# Patient Record
Sex: MALE | Race: WHITE | NOT HISPANIC OR LATINO | Employment: OTHER | URBAN - NONMETROPOLITAN AREA
[De-identification: names, ages, dates, MRNs, and addresses within clinical notes are randomized per-mention and may not be internally consistent; named-entity substitution may affect disease eponyms.]

---

## 2024-01-17 NOTE — PROGRESS NOTES
Chief Complaint   Patient presents with    Establish Care     Patient referred per PCP for hx of MI in 2015 and HTN.    MI     Had in 2015, had stent placement at [facility]. He did stop smoking at that time. Attempting to obtain records. Father passed away at age of 55 due to heart disease.    Chest discomfort     Has had couple episodes of discomfort while sitting in tree stand with some pain between shoulders.    Shortness of Breath     Only notices when walking up and down hills.    Palpitations     Has occasional flutter. Was drinking a lot of coffee daily, and diet pepsi. He is trying to decrease caffeine intake.    Blood pressure      Had been elevated, PCP started lisinopril. He feels increase in BP is related to stress.         Subjective   Initial cardiac evaluation    HPI  HPI  Chris Ervin is a 58-year-old male in today for an initial cardiac evaluation.  He has a history of CAD, hypertension, diabetes and hyperlipidemia. He had an MI in  and was stented.  His father  of heart disease at age 55.  He is a former smoker stopped in  after the MI.  He admitted to drinking a sixpack of beer a week but no more than 2 at a time.  Labs managed by PCP.  Recent labs 2023: Normal CBC glucose 260, creatinine 0.87, GFR 99, potassium 5.4, normal liver enzymes, TSH 0.866, total cholesterol 187, triglycerides 135, HDL 35, .  He has not seen a cardiologist since 2017. Patient denies chest pain, pressure, palpitations, tightness, dizziness.  He reports that he has had a few episodes of chest discomfort and shortness of breath when walking up and down hills while deer hunting.  He has not had health insurance for a few years which has been causing stress.      Cardiac History  Past Surgical History:   Procedure Laterality Date    CARDIAC CATHETERIZATION Left 10/27/2015    PCI to proximal-mid LAD, Xience Alpine 2.75/38 mm drug-eluting stent.  LVEF 25 to 30%       Current Outpatient Medications    Medication Sig Dispense Refill    aspirin 81 MG EC tablet Take 1 tablet by mouth Daily.      glimepiride (AMARYL) 4 MG tablet Take 1 tablet by mouth 2 (Two) Times a Day.      Insulin Aspart (novoLOG) 100 UNIT/ML injection Inject  under the skin into the appropriate area as directed 3 (Three) Times a Day Before Meals. Per Sliding scale      insulin detemir (LEVEMIR) 100 UNIT/ML injection Inject 50 Units under the skin into the appropriate area as directed Every Night.      lisinopril (PRINIVIL,ZESTRIL) 10 MG tablet Take 1 tablet by mouth Daily.      Multiple Vitamins-Minerals (EYE VITAMINS PO) Take  by mouth 2 (Two) Times a Day.      NON FORMULARY Total restore supplement twice daily      Saw Palmetto 500 MG capsule Take 2 capsules by mouth Daily.      tamsulosin (FLOMAX) 0.4 MG capsule 24 hr capsule Take 1 capsule by mouth Daily.      vitamin D (ERGOCALCIFEROL) 1.25 MG (75912 UT) capsule capsule Take 1 capsule by mouth 1 (One) Time Per Week.       No current facility-administered medications for this visit.       Patient has no known allergies.    Past Medical History:   Diagnosis Date    Diabetes mellitus     History of carpal tunnel repair     right    Hx of tonsillectomy     Hyperlipidemia     Hypertension     Macular degeneration, wet     Myocardial infarction        Social History     Socioeconomic History    Marital status:    Tobacco Use    Smoking status: Former     Packs/day: 1.50     Years: 30.00     Additional pack years: 0.00     Total pack years: 45.00     Types: Cigarettes     Quit date: 10/27/2015     Years since quittin.2    Smokeless tobacco: Never   Vaping Use    Vaping Use: Never used    Passive vaping exposure: Yes   Substance and Sexual Activity    Alcohol use: Yes     Alcohol/week: 6.0 standard drinks of alcohol     Types: 6 Cans of beer per week    Drug use: Never    Sexual activity: Defer       Family History   Problem Relation Age of Onset    Hypertension Mother     Macular  "degeneration Mother         wet    Glaucoma Mother     Heart disease Father     Hypertension Father     Heart attack Father     Diabetes Father     Transient ischemic attack Father     No Known Problems Sister     No Known Problems Brother     No Known Problems Brother     Macular degeneration Maternal Grandmother     Lung cancer Maternal Grandmother     Heart attack Maternal Grandfather        Objective     Vitals:   /70 (BP Location: Left arm)   Pulse 78   Ht 172.7 cm (68\")   Wt 107 kg (236 lb 9.6 oz)   BMI 35.97 kg/m²     Physical Exam  Vitals and nursing note reviewed.   Neck:      Vascular: No carotid bruit.   Cardiovascular:      Rate and Rhythm: Normal rate and regular rhythm.      Pulses: Normal pulses.      Heart sounds: Normal heart sounds. No murmur heard.     No friction rub. No gallop.   Pulmonary:      Effort: Pulmonary effort is normal.      Breath sounds: Normal breath sounds and air entry.   Musculoskeletal:      Right lower leg: No edema.      Left lower leg: No edema.   Skin:     General: Skin is warm and dry.      Capillary Refill: Capillary refill takes less than 2 seconds.   Neurological:      Mental Status: He is alert and oriented to person, place, and time.           ECG 12 Lead    Date/Time: 1/18/2024 10:51 AM  Performed by: Nohemi José APRN    Authorized by: Nohemi José APRN  Previous ECG: no previous ECG available  Rhythm: sinus rhythm  Rate: normal  BPM: 78  Conduction: conduction normal    Clinical impression: normal ECG  Comments: QTc interval 387 ms          Assessment & Plan     Diagnoses and all orders for this visit:    1. SOB (shortness of breath) on exertion (Primary)  -     Adult Transthoracic Echo Complete W/ Cont if Necessary Per Protocol; Future  -     Stress Test With Myocardial Perfusion One Day; Future  -     ECG 12 Lead    2. Essential hypertension    3. Coronary artery disease involving native coronary artery of native heart, unspecified whether angina " present    4. Hyperlipidemia LDL goal <100    Shortness of breath  - Ordered stress test and echocardiogram to evaluate for ischemia, ejection fraction, valve structures    HTN  - BP slightly elevated today  - He monitors at home and states it stays in the 120s to 130s SBP  - Continue lisinopril    Hyperlipidemia  - Lipids mostly controlled, low HDL, high LDL  - He declined statin.  He wishes to manage with diet and exercise  - Eliminate sugar and limit carbohydrates.  Recommend fish oil    CAD  - Continue aspirin 81 mg daily  - Ordered stress test and echocardiogram    In office EKG today revealed sinus rhythm with a rate of 78 bpm and a normal QTc 387 ms    Visit Diagnoses:    ICD-10-CM ICD-9-CM   1. SOB (shortness of breath) on exertion  R06.02 786.05   2. Essential hypertension  I10 401.9   3. Coronary artery disease involving native coronary artery of native heart, unspecified whether angina present  I25.10 414.01   4. Hyperlipidemia LDL goal <100  E78.5 272.4       Meds Ordered During Visit:  No orders of the defined types were placed in this encounter.      Follow Up Appointment:   Return in about 6 months (around 7/18/2024), or if symptoms worsen or fail to improve.           This document has been electronically signed by FLACO Delacruz  January 18, 2024 12:08 EST    Dictated Utilizing Dragon Dictation: Part of this note may be an electronic transcription/translation of spoken language to printed text using the Dragon Dictation System.

## 2024-01-18 ENCOUNTER — PATIENT ROUNDING (BHMG ONLY) (OUTPATIENT)
Dept: CARDIOLOGY | Facility: CLINIC | Age: 59
End: 2024-01-18
Payer: COMMERCIAL

## 2024-01-18 ENCOUNTER — OFFICE VISIT (OUTPATIENT)
Dept: CARDIOLOGY | Facility: CLINIC | Age: 59
End: 2024-01-18
Payer: COMMERCIAL

## 2024-01-18 VITALS
SYSTOLIC BLOOD PRESSURE: 144 MMHG | BODY MASS INDEX: 35.86 KG/M2 | HEART RATE: 78 BPM | DIASTOLIC BLOOD PRESSURE: 70 MMHG | WEIGHT: 236.6 LBS | HEIGHT: 68 IN

## 2024-01-18 DIAGNOSIS — E78.5 HYPERLIPIDEMIA LDL GOAL <100: ICD-10-CM

## 2024-01-18 DIAGNOSIS — I25.10 CORONARY ARTERY DISEASE INVOLVING NATIVE CORONARY ARTERY OF NATIVE HEART, UNSPECIFIED WHETHER ANGINA PRESENT: ICD-10-CM

## 2024-01-18 DIAGNOSIS — I10 ESSENTIAL HYPERTENSION: ICD-10-CM

## 2024-01-18 DIAGNOSIS — R06.02 SOB (SHORTNESS OF BREATH) ON EXERTION: Primary | ICD-10-CM

## 2024-01-18 PROBLEM — E11.9 DM (DIABETES MELLITUS), TYPE 2: Status: ACTIVE | Noted: 2024-01-18

## 2024-01-18 RX ORDER — ASPIRIN 81 MG/1
81 TABLET ORAL DAILY
COMMUNITY

## 2024-01-18 RX ORDER — LISINOPRIL 10 MG/1
10 TABLET ORAL DAILY
COMMUNITY

## 2024-01-18 RX ORDER — ERGOCALCIFEROL 1.25 MG/1
50000 CAPSULE ORAL WEEKLY
COMMUNITY

## 2024-01-18 RX ORDER — TAMSULOSIN HYDROCHLORIDE 0.4 MG/1
1 CAPSULE ORAL DAILY
COMMUNITY

## 2024-01-18 RX ORDER — GUAIFENESIN/PHENYLPROPANOLAMIN
2 EXPECTORANT ORAL DAILY
COMMUNITY

## 2024-01-18 RX ORDER — GLIMEPIRIDE 4 MG/1
4 TABLET ORAL 2 TIMES DAILY
COMMUNITY

## 2024-01-18 RX ORDER — INSULIN ASPART 100 [IU]/ML
INJECTION, SOLUTION INTRAVENOUS; SUBCUTANEOUS
COMMUNITY

## 2024-01-18 NOTE — PROGRESS NOTES
January 18, 2024    Hello, may I speak with Chris Ervin?    My name is ABBY      I am  with [facility]  [facility]  [address] [place] [address] [phone].    Before we get started may I verify your date of birth? 1965    I am calling to officially welcome you to our practice and ask about your recent visit. Is this a good time to talk? yes    Tell me about your visit with us. What things went well?  ALL WENT WELL       We're always looking for ways to make our patients' experiences even better. Do you have recommendations on ways we may improve?  no    Overall were you satisfied with your first visit to our practice? yes       I appreciate you taking the time to speak with me today. Is there anything else I can do for you? no      Thank you, and have a great day.

## 2024-01-31 ENCOUNTER — TELEPHONE (OUTPATIENT)
Dept: CARDIOLOGY | Facility: CLINIC | Age: 59
End: 2024-01-31
Payer: COMMERCIAL

## 2024-01-31 NOTE — TELEPHONE ENCOUNTER
REQUEST FOR CARDIAC CLEARANCE    Caller name: Chris Ervin     Phone Number: [phone]     Surgeon's name: DR. SEBASTIAN     Type of planned surgery: colonoscopy    Date of planned surgery: WAITING ON SURG     Type of anesthesia: MAC     Have you been experiencing chest pain or shortness of breath? UNKNOWN     Is your doctor requesting for you to stop any of your medications prior to your surgery? BLOOD THINNERS     Where should we fax the clearance to? [phone]     SENDING OVER FORM, PLEASE SEND BACK FILLED OUT AND SIGNED.

## 2024-02-05 ENCOUNTER — HOSPITAL ENCOUNTER (OUTPATIENT)
Dept: CARDIOLOGY | Facility: HOSPITAL | Age: 59
Discharge: HOME | End: 2024-02-05
Payer: COMMERCIAL

## 2024-02-05 VITALS — HEIGHT: 68 IN | WEIGHT: 235.89 LBS | BODY MASS INDEX: 35.75 KG/M2

## 2024-02-05 DIAGNOSIS — R06.02 SOB (SHORTNESS OF BREATH) ON EXERTION: ICD-10-CM

## 2024-02-05 LAB
AORTIC DIMENSIONLESS INDEX: 1.1 (DI)
BH CV ECHO MEAS - ACS: 2.21 CM
BH CV ECHO MEAS - AO MAX PG: 3.9 MMHG
BH CV ECHO MEAS - AO MEAN PG: 2.45 MMHG
BH CV ECHO MEAS - AO ROOT DIAM: 3.6 CM
BH CV ECHO MEAS - AO V2 MAX: 98.5 CM/SEC
BH CV ECHO MEAS - AO V2 VTI: 20.5 CM
BH CV ECHO MEAS - EDV(CUBED): 92.6 ML
BH CV ECHO MEAS - EF(MOD-BP): 56 %
BH CV ECHO MEAS - ESV(CUBED): 33.3 ML
BH CV ECHO MEAS - FS: 28.9 %
BH CV ECHO MEAS - IVS/LVPW: 1.01 CM
BH CV ECHO MEAS - IVSD: 1.16 CM
BH CV ECHO MEAS - LA DIMENSION: 3.7 CM
BH CV ECHO MEAS - LAT PEAK E' VEL: 8.9 CM/SEC
BH CV ECHO MEAS - LV MASS(C)D: 187.8 GRAMS
BH CV ECHO MEAS - LV MAX PG: 4.3 MMHG
BH CV ECHO MEAS - LV MEAN PG: 1.87 MMHG
BH CV ECHO MEAS - LV V1 MAX: 103.7 CM/SEC
BH CV ECHO MEAS - LV V1 VTI: 23.5 CM
BH CV ECHO MEAS - LVIDD: 4.5 CM
BH CV ECHO MEAS - LVIDS: 3.2 CM
BH CV ECHO MEAS - LVPWD: 1.14 CM
BH CV ECHO MEAS - MED PEAK E' VEL: 5.2 CM/SEC
BH CV ECHO MEAS - MV A MAX VEL: 81.7 CM/SEC
BH CV ECHO MEAS - MV DEC SLOPE: 369.9 CM/SEC2
BH CV ECHO MEAS - MV DEC TIME: 0.23 SEC
BH CV ECHO MEAS - MV E MAX VEL: 89.6 CM/SEC
BH CV ECHO MEAS - MV E/A: 1.1
BH CV ECHO MEAS - MV MAX PG: 4.4 MMHG
BH CV ECHO MEAS - MV MEAN PG: 1.73 MMHG
BH CV ECHO MEAS - MV P1/2T: 73.4 MSEC
BH CV ECHO MEAS - MV V2 VTI: 35.3 CM
BH CV ECHO MEAS - MVA(P1/2T): 3 CM2
BH CV ECHO MEAS - PA V2 MAX: 91.8 CM/SEC
BH CV ECHO MEAS - RAP SYSTOLE: 8 MMHG
BH CV ECHO MEAS - RV MAX PG: 1.61 MMHG
BH CV ECHO MEAS - RV V1 MAX: 63.4 CM/SEC
BH CV ECHO MEAS - RV V1 VTI: 12.5 CM
BH CV ECHO MEAS - RVDD: 3.3 CM
BH CV ECHO MEAS - RVSP: 12.7 MMHG
BH CV ECHO MEAS - TAPSE (>1.6): 2.8 CM
BH CV ECHO MEAS - TR MAX PG: 4.7 MMHG
BH CV ECHO MEAS - TR MAX VEL: 108.2 CM/SEC
BH CV ECHO MEASUREMENTS AVERAGE E/E' RATIO: 12.71
BH CV REST NUCLEAR ISOTOPE DOSE: 10 MCI
BH CV STRESS COMMENTS STAGE 1: NORMAL
BH CV STRESS DOSE REGADENOSON STAGE 1: 0.4
BH CV STRESS DURATION MIN STAGE 1: 0
BH CV STRESS DURATION SEC STAGE 1: 10
BH CV STRESS NUCLEAR ISOTOPE DOSE: 30 MCI
BH CV STRESS PROTOCOL 1: NORMAL
BH CV STRESS RECOVERY BP: NORMAL MMHG
BH CV STRESS RECOVERY HR: 99 BPM
BH CV STRESS STAGE 1: 1
BH CV XLRA - TDI S': 13.5 CM/SEC
LV EF NUC BP: 49 %
MAXIMAL PREDICTED HEART RATE: 160 BPM
PERCENT MAX PREDICTED HR: 69.38 %
SINUS: 3.3 CM
STRESS BASELINE BP: NORMAL MMHG
STRESS BASELINE HR: 72 BPM
STRESS PERCENT HR: 82 %
STRESS POST PEAK BP: NORMAL MMHG
STRESS POST PEAK HR: 111 BPM
STRESS TARGET HR: 136 BPM

## 2024-02-05 PROCEDURE — 25010000002 REGADENOSON 0.4 MG/5ML SOLUTION: Performed by: INTERNAL MEDICINE

## 2024-02-05 PROCEDURE — 93306 TTE W/DOPPLER COMPLETE: CPT

## 2024-02-05 PROCEDURE — 78452 HT MUSCLE IMAGE SPECT MULT: CPT

## 2024-02-05 PROCEDURE — 0 TECHNETIUM SESTAMIBI: Performed by: INTERNAL MEDICINE

## 2024-02-05 PROCEDURE — 93017 CV STRESS TEST TRACING ONLY: CPT

## 2024-02-05 PROCEDURE — A9500 TC99M SESTAMIBI: HCPCS | Performed by: INTERNAL MEDICINE

## 2024-02-05 PROCEDURE — 93306 TTE W/DOPPLER COMPLETE: CPT | Performed by: INTERNAL MEDICINE

## 2024-02-05 PROCEDURE — 93018 CV STRESS TEST I&R ONLY: CPT | Performed by: INTERNAL MEDICINE

## 2024-02-05 PROCEDURE — 78452 HT MUSCLE IMAGE SPECT MULT: CPT | Performed by: INTERNAL MEDICINE

## 2024-02-05 RX ORDER — REGADENOSON 0.08 MG/ML
0.4 INJECTION, SOLUTION INTRAVENOUS
Status: COMPLETED | OUTPATIENT
Start: 2024-02-05 | End: 2024-02-05

## 2024-02-05 RX ADMIN — TECHNETIUM TC 99M SESTAMIBI 1 DOSE: 1 INJECTION INTRAVENOUS at 09:40

## 2024-02-05 RX ADMIN — REGADENOSON 0.4 MG: 0.08 INJECTION, SOLUTION INTRAVENOUS at 11:16

## 2024-02-05 RX ADMIN — TECHNETIUM TC 99M SESTAMIBI 1 DOSE: 1 INJECTION INTRAVENOUS at 11:16

## 2024-02-07 ENCOUNTER — TELEPHONE (OUTPATIENT)
Dept: CARDIOLOGY | Facility: CLINIC | Age: 59
End: 2024-02-07
Payer: COMMERCIAL

## 2024-02-07 NOTE — TELEPHONE ENCOUNTER
Received fax from Dr. Burroughs for cardiac clearance for patient to have a colonoscopy under MAC. Patient is on aspirin, unclear if needing to hold. According to our records, patient's last stenting was done on 10/27/15.          Fax [phone]

## 2024-07-24 ENCOUNTER — OFFICE VISIT (OUTPATIENT)
Dept: CARDIOLOGY | Facility: CLINIC | Age: 59
End: 2024-07-24
Payer: COMMERCIAL

## 2024-07-24 VITALS
WEIGHT: 224.8 LBS | BODY MASS INDEX: 34.07 KG/M2 | HEART RATE: 84 BPM | SYSTOLIC BLOOD PRESSURE: 128 MMHG | DIASTOLIC BLOOD PRESSURE: 62 MMHG | HEIGHT: 68 IN

## 2024-07-24 DIAGNOSIS — E78.5 HYPERLIPIDEMIA LDL GOAL <100: ICD-10-CM

## 2024-07-24 DIAGNOSIS — I25.10 CORONARY ARTERY DISEASE INVOLVING NATIVE CORONARY ARTERY OF NATIVE HEART, UNSPECIFIED WHETHER ANGINA PRESENT: ICD-10-CM

## 2024-07-24 DIAGNOSIS — I10 ESSENTIAL HYPERTENSION: Primary | ICD-10-CM

## 2024-07-24 DIAGNOSIS — R40.0 DAYTIME SLEEPINESS: ICD-10-CM

## 2024-07-24 NOTE — PROGRESS NOTES
Chief Complaint   Patient presents with    Follow-up     Pt is here for cardiac follow up. Pt does have some palpitations at times but new normal. Pt last stress test had to be stopped due to HTN response, pt would like to know when he can have a new one done to complete it.       Med Refill     Pt does not need refills at this time.     Labs Only     2023 last in chart. Had some a week ago with PCP. A1C dropped.         HPI:  HPI   Chris Ervin is a 59-year-old male with a history of CAD, hypertension, diabetes and hyperlipidemia. He had an MI in  and was stented.  His father  of heart disease at age 55.  He is a former smoker stopped in  after the MI.  He admitted to drinking a sixpack of beer a week but no more than 2 at a time.  Labs managed by PCP.  Recent labs 2023: Normal CBC glucose 260, creatinine 0.87, GFR 99, potassium 5.4, normal liver enzymes, TSH 0.866, total cholesterol 187, triglycerides 135, HDL 35, .  He has not seen a cardiologist since . He reported that he had a few episodes of chest discomfort and shortness of breath when walking up and down hills while deer hunting.  He has not had health insurance for a few years which has been causing stress.     He is here today for a follow up visit.  After his initial visit in January he did have a Lexiscan stress test that showed a hypertensive blood pressure response borderline ejection fraction and small septal infarct with no ischemia.  He was started on lisinopril and HCTZ.  Was recommended that we evaluate for sleep apnea.  Echocardiogram showed 50% ejection fraction with mild MVR and TVR. He has had a 10 lb weight loss and BP has been very controlled. He has stopped HCTZ due to BP dropping too low after weight loss. Patient denies chest pain, pressure, palpitations, tightness, dizziness, shortness of air. I had recommended overnight oxymetry after stress. He is agreeable. Labs a couple weeks ago and lipids  controlled, A1C dropped.    Cardiac History:    Past Surgical History:   Procedure Laterality Date    CARDIAC CATHETERIZATION Left 10/27/2015    PCI to proximal-mid LAD, Xience Alpine 2.75/38 mm drug-eluting stent.  LVEF 25 to 30%    CARDIOVASCULAR STRESS TEST  02/05/2024    Lexiscan- EF 49%. 224/68. Septal Infarct    ECHO - CONVERTED  02/05/2024    TLS. EF 50%. Septal WMA. Trace-Mild MR. RVSP- 13 mmHg       Current Outpatient Medications   Medication Sig Dispense Refill    aspirin 81 MG EC tablet Take 1 tablet by mouth Daily.      glimepiride (AMARYL) 4 MG tablet Take 1 tablet by mouth 2 (Two) Times a Day.      Insulin Aspart (novoLOG) 100 UNIT/ML injection Inject  under the skin into the appropriate area as directed 3 (Three) Times a Day Before Meals. Per Sliding scale      insulin detemir (LEVEMIR) 100 UNIT/ML injection Inject 50 Units under the skin into the appropriate area as directed Every Night.      lisinopril (PRINIVIL,ZESTRIL) 20 MG tablet Take 1 tablet by mouth Daily. 90 tablet 3    Multiple Vitamins-Minerals (EYE VITAMINS PO) Take  by mouth 2 (Two) Times a Day.      NON FORMULARY Total restore supplement twice daily      Saw Palmetto 500 MG capsule Take 2 capsules by mouth Daily.      tamsulosin (FLOMAX) 0.4 MG capsule 24 hr capsule Take 1 capsule by mouth Daily.      vitamin D (ERGOCALCIFEROL) 1.25 MG (19953 UT) capsule capsule Take 1 capsule by mouth 1 (One) Time Per Week.       No current facility-administered medications for this visit.       Patient has no known allergies.    Past Medical History:   Diagnosis Date    Diabetes mellitus     History of carpal tunnel repair     right    Hx of tonsillectomy     Hyperlipidemia     Hypertension     Macular degeneration, wet     Myocardial infarction        Social History     Socioeconomic History    Marital status:    Tobacco Use    Smoking status: Former     Current packs/day: 0.00     Average packs/day: 1.5 packs/day for 30.0 years (45.0 ttl  "pk-yrs)     Types: Cigarettes     Start date: 10/27/1985     Quit date: 10/27/2015     Years since quittin.7    Smokeless tobacco: Never   Vaping Use    Vaping status: Never Used    Passive vaping exposure: Yes   Substance and Sexual Activity    Alcohol use: Yes     Alcohol/week: 6.0 standard drinks of alcohol     Types: 6 Cans of beer per week    Drug use: Never    Sexual activity: Defer       Family History   Problem Relation Age of Onset    Hypertension Mother     Macular degeneration Mother         wet    Glaucoma Mother     Heart disease Father     Hypertension Father     Heart attack Father     Diabetes Father     Transient ischemic attack Father     No Known Problems Sister     No Known Problems Brother     No Known Problems Brother     Macular degeneration Maternal Grandmother     Lung cancer Maternal Grandmother     Heart attack Maternal Grandfather        Vitals:   /62 (BP Location: Right arm, Patient Position: Sitting, Cuff Size: Adult)   Pulse 84   Ht 172 cm (67.72\")   Wt 102 kg (224 lb 12.8 oz)   BMI 34.46 kg/m²     Physical Exam  Vitals and nursing note reviewed.   Constitutional:       Appearance: He is obese.   Neck:      Vascular: No carotid bruit.   Cardiovascular:      Rate and Rhythm: Normal rate and regular rhythm.      Pulses: Normal pulses.      Heart sounds: Normal heart sounds. No murmur heard.     No friction rub. No gallop.   Pulmonary:      Effort: Pulmonary effort is normal.      Breath sounds: Normal breath sounds and air entry.   Musculoskeletal:      Right lower leg: No edema.      Left lower leg: No edema.   Skin:     General: Skin is warm and dry.      Capillary Refill: Capillary refill takes less than 2 seconds.   Neurological:      Mental Status: He is alert and oriented to person, place, and time.       Procedures     Assessment & Plan     Diagnoses and all orders for this visit:    1. Essential hypertension (Primary)    2. Coronary artery disease involving native " coronary artery of native heart, unspecified whether angina present    3. Hyperlipidemia LDL goal <100    4. Daytime sleepiness  -     Overnight Sleep Oximetry Study; Future    Hypertension  - BP is controlled    CAD  - Stress test earlier this year showed no ischemia but did show an area of infarct with a hypertensive blood pressure response  - Continue aspirin    Hyperlipidemia  - According to patient, labs were with PCP and lipids were controlled with lifestyle management    Daytime sleepiness  - Recommend overnight oximetry and if abnormal, referral to pulmonology    Recommend overnight oxymetry.  Stable from cardiac standpoint. No refills needed.     Visit Diagnoses:    ICD-10-CM ICD-9-CM   1. Essential hypertension  I10 401.9   2. Coronary artery disease involving native coronary artery of native heart, unspecified whether angina present  I25.10 414.01   3. Hyperlipidemia LDL goal <100  E78.5 272.4   4. Daytime sleepiness  R40.0 780.54       Meds Ordered During Visit:  No orders of the defined types were placed in this encounter.      Follow Up Appointment:   Return in about 6 months (around 1/24/2025), or if symptoms worsen or fail to improve.           This document has been electronically signed by FLACO Delacruz  July 24, 2024 15:21 EDT    Dictated Utilizing Dragon Dictation: Part of this note may be an electronic transcription/translation of spoken language to printed text using the Dragon Dictation System.

## 2024-12-23 ENCOUNTER — TELEPHONE (OUTPATIENT)
Dept: CARDIOLOGY | Facility: CLINIC | Age: 59
End: 2024-12-23

## 2024-12-23 DIAGNOSIS — G47.34 NOCTURNAL OXYGEN DESATURATION: Primary | ICD-10-CM

## 2024-12-23 NOTE — TELEPHONE ENCOUNTER
I spoke with patient and he has been experiencing palpitations which are  becoming more frequent  . He had referral to see Pulmonology which was scheduled in [place] , he was unable to keep appointment due to distance traveling . He is hesitant to wear CPAP if needed but is willing to reschedule appointment with pulmonology .

## 2024-12-23 NOTE — TELEPHONE ENCOUNTER
"Hub staff attempted to follow warm transfer process and was unsuccessful     Caller: Chris Ervin    Relationship to patient: Self    Best call back number: [phone]     Patient is needing: PT IS NOT HAVING PAIN OR DISCOMFORT, BUT HAS A \"FLUTTER THING\" THAT HAS BEEN GOING ON. DOES THIS ONCE IN AWHILE, BUT IS NOW HAPPENING MORE. BP IS UP A LITTLE BIT.     ACTIVE RIGHT BEFORE HE CALLED BUT NOT AS OF PHONE CALL   A FEW MONTHS   WAS HAPPENING WHEN PT WAS SEEN LAST TIME     STATES MAYBE NEEDING 24 HR HEART MONITOR PER PROVIDER STATEMENT AT LAST APPT       "

## 2024-12-24 NOTE — TELEPHONE ENCOUNTER
Ordered a Holter monitor for 7 days that will be mailed to him.  He needs to wear the monitor and then mail it back.  We are closed for the next 7 days but as soon as we return and get the results we will call him with results.  I have put in a new referral to Aline schwarz in the [place] office.  There is a oh longer wait but I do want him to see pulmonology because of the abnormal overnight.  I think this is a root cause that needs to be addressed.

## 2024-12-24 NOTE — TELEPHONE ENCOUNTER
I left detailed message for patient about ordered Holter monitor , he is to wear it for 7 days   and mail back. I also made him aware of new referral to [facility] pulmonolgy

## 2025-01-02 ENCOUNTER — CLINICAL SUPPORT (OUTPATIENT)
Dept: CARDIOLOGY | Facility: CLINIC | Age: 60
End: 2025-01-02
Payer: COMMERCIAL

## 2025-01-02 ENCOUNTER — OFFICE VISIT (OUTPATIENT)
Dept: PULMONOLOGY | Facility: CLINIC | Age: 60
End: 2025-01-02
Payer: COMMERCIAL

## 2025-01-02 VITALS
HEART RATE: 84 BPM | OXYGEN SATURATION: 98 % | BODY MASS INDEX: 34.25 KG/M2 | HEIGHT: 68 IN | WEIGHT: 226 LBS | SYSTOLIC BLOOD PRESSURE: 140 MMHG | DIASTOLIC BLOOD PRESSURE: 76 MMHG

## 2025-01-02 DIAGNOSIS — G47.19 DAYTIME HYPERSOMNOLENCE: Primary | ICD-10-CM

## 2025-01-02 DIAGNOSIS — Z87.891 PERSONAL HISTORY OF NICOTINE DEPENDENCE: ICD-10-CM

## 2025-01-02 DIAGNOSIS — Z12.2 ENCOUNTER FOR SCREENING FOR LUNG CANCER: ICD-10-CM

## 2025-01-02 DIAGNOSIS — R06.83 SNORING: ICD-10-CM

## 2025-01-02 DIAGNOSIS — E66.9 OBESITY (BMI 30-39.9): ICD-10-CM

## 2025-01-02 RX ORDER — CHOLECALCIFEROL (VITAMIN D3) 1250 MCG
1 CAPSULE ORAL WEEKLY
COMMUNITY
Start: 2024-10-23

## 2025-01-02 RX ORDER — INSULIN GLARGINE 100 [IU]/ML
INJECTION, SOLUTION SUBCUTANEOUS
COMMUNITY
Start: 2024-12-16

## 2025-01-02 NOTE — PROGRESS NOTES
New Pulmonary Patient Office Visit      Patient Name: Chris Ervin    Referring Physician: Nohemi José APRN    Chief Complaint:    Chief Complaint   Patient presents with    Sleeping Problem       History of Present Illness: Chris Ervin is a 59 y.o. male who is referred here today by cardiology to establish care with Pulmonary for sleep apnea evaluation. He notes that he awakens 2-3x/night to go to the bathroom. He reports a longstanding hx of witnessed apneas. +daytime hypersomnolence. +snoring. No known hc of underlying lung disease. He reports good functional capacity.  He has been losing weight with lifestyle modifications and plans to continue doing so.    Supplemental Oxygen: No  Cardiac history: MI in 2015  Smoking history: Up to 1.5 pack/day for 30 years, quit in 2015    Subjective      Review of Systems:   Review of Systems   Constitutional:  Positive for fatigue. Negative for fever and unexpected weight change.   Respiratory:  Negative for shortness of breath.    Cardiovascular:  Negative for chest pain.   Psychiatric/Behavioral:  Positive for sleep disturbance.         Past Medical History:   Past Medical History:   Diagnosis Date    Diabetes mellitus     History of carpal tunnel repair     right    Hx of tonsillectomy     Hyperlipidemia     Hypertension     Macular degeneration, wet     Myocardial infarction        Past Surgical History:   Past Surgical History:   Procedure Laterality Date    CARDIAC CATHETERIZATION Left 10/27/2015    PCI to proximal-mid LAD, Xience Alpine 2.75/38 mm drug-eluting stent.  LVEF 25 to 30%    CARDIOVASCULAR STRESS TEST  02/05/2024    Lexiscan- EF 49%. 224/68. Septal Infarct    ECHO - CONVERTED  02/05/2024    TLS. EF 50%. Septal WMA. Trace-Mild MR. RVSP- 13 mmHg       Family History:   Family History   Problem Relation Age of Onset    Hypertension Mother     Macular degeneration Mother         wet    Glaucoma Mother     Heart disease Father     Hypertension  Father     Heart attack Father     Diabetes Father     Transient ischemic attack Father     No Known Problems Sister     No Known Problems Brother     No Known Problems Brother     Macular degeneration Maternal Grandmother     Lung cancer Maternal Grandmother     Heart attack Maternal Grandfather        Social History:   Social History     Socioeconomic History    Marital status:    Tobacco Use    Smoking status: Former     Current packs/day: 0.00     Average packs/day: 1.5 packs/day for 30.0 years (45.0 ttl pk-yrs)     Types: Cigarettes     Start date: 10/27/1985     Quit date: 10/27/2015     Years since quittin.1     Passive exposure: Past    Smokeless tobacco: Never   Vaping Use    Vaping status: Never Used    Passive vaping exposure: Yes   Substance and Sexual Activity    Alcohol use: Yes     Alcohol/week: 6.0 standard drinks of alcohol     Types: 6 Cans of beer per week    Drug use: Never    Sexual activity: Defer       Medications:     Current Outpatient Medications:     aspirin 81 MG EC tablet, Take 1 tablet by mouth Daily., Disp: , Rfl:     Cholecalciferol (Vitamin D3) 1.25 MG (62081 UT) capsule, Take 1 capsule by mouth 1 (One) Time Per Week., Disp: , Rfl:     glimepiride (AMARYL) 4 MG tablet, Take 1 tablet by mouth 2 (Two) Times a Day., Disp: , Rfl:     Insulin Aspart (novoLOG) 100 UNIT/ML injection, Inject  under the skin into the appropriate area as directed 3 (Three) Times a Day Before Meals. Per Sliding scale, Disp: , Rfl:     insulin detemir (LEVEMIR) 100 UNIT/ML injection, Inject 50 Units under the skin into the appropriate area as directed Every Night., Disp: , Rfl:     Lantus SoloStar 100 UNIT/ML injection pen, inject 30 units sub-q TWICE DAILY, Disp: , Rfl:     lisinopril (PRINIVIL,ZESTRIL) 20 MG tablet, Take 1 tablet by mouth Daily., Disp: 90 tablet, Rfl: 3    Multiple Vitamins-Minerals (EYE VITAMINS PO), Take  by mouth 2 (Two) Times a Day., Disp: , Rfl:     NON FORMULARY, Total restore  "supplement twice daily, Disp: , Rfl:     Saw Palmetto 500 MG capsule, Take 2 capsules by mouth Daily., Disp: , Rfl:     tamsulosin (FLOMAX) 0.4 MG capsule 24 hr capsule, Take 1 capsule by mouth Daily., Disp: , Rfl:     vitamin D (ERGOCALCIFEROL) 1.25 MG (05556 UT) capsule capsule, Take 1 capsule by mouth 1 (One) Time Per Week. (Patient not taking: Reported on 1/2/2025), Disp: , Rfl:     Allergies:   No Known Allergies    Objective     Physical Exam:  Vital Signs:   Vitals:    01/02/25 0903   BP: 140/76   Pulse: 84   SpO2: 98%   Weight: 103 kg (226 lb)   Height: 172.1 cm (67.75\")     Body mass index is 34.62 kg/m².    Physical Exam  Vitals reviewed.   Constitutional:       General: He is not in acute distress.     Appearance: He is not toxic-appearing.   HENT:      Head: Normocephalic and atraumatic.      Mouth/Throat:      Mouth: Mucous membranes are moist.   Eyes:      Extraocular Movements: Extraocular movements intact.      Conjunctiva/sclera: Conjunctivae normal.   Cardiovascular:      Rate and Rhythm: Normal rate.      Heart sounds: Normal heart sounds.   Pulmonary:      Effort: Pulmonary effort is normal.      Breath sounds: Normal breath sounds.   Abdominal:      General: There is no distension.      Palpations: Abdomen is soft.   Musculoskeletal:         General: No swelling.      Cervical back: Neck supple.   Skin:     General: Skin is warm and dry.      Findings: No rash.   Neurological:      General: No focal deficit present.      Mental Status: He is alert and oriented to person, place, and time.   Psychiatric:         Mood and Affect: Mood normal.         Behavior: Behavior normal.       Results Review:   July 2024 overnight pulse oximetry study showed O2 saturation less than or equal to 88% for 1 hour 34 seconds.    Results for orders placed during the hospital encounter of 02/05/24    Adult Transthoracic Echo Complete W/ Cont if Necessary Per Protocol    Interpretation Summary    The study is " technically difficult    Left ventricular wall thickness is consistent with mild concentric hypertrophy.    Left ventricular ejection fraction appears to be 46 - 50%.    The following left ventricular wall segments are hypokinetic: apical septal and apex hypokinetic.    Left ventricular diastolic function is consistent with (grade I) impaired relaxation.    The left atrial cavity is borderline dilated. 3.7 Cm    There is trivial thickening of the non-coronary cusp(s) of the aortic valve    No aortic valve regurgitation or stenosis is present.    Trace to mild mitral valve regurgitation is present.    Trace to mild tricuspid valve regurgitation is present.  RVSP- 13 mmHg    Assessment / Plan      Assessment/Plan:    Diagnoses and all orders for this visit:    1. Daytime hypersomnolence (Primary)  -     Home Sleep Study; Future  Patient with multiple symptoms suggestive of sleep apnea. Discussed treatment options if HSS is positive. Will check sleep study to evaluate. The patient was counseled on the risks of untreated sleep apnea and voiced understanding. Avoid sleepy driving.    2. Snoring  -     Home Sleep Study; Future    3. Personal history of nicotine dependence  -      CT Chest Low Dose Cancer Screening WO; Future  Maintains cessation.    4. Encounter for screening for lung cancer  -      CT Chest Low Dose Cancer Screening WO; Future  Patient with greater than 30-pack-year smoking history, and therefore low-dose lung cancer screening is recommended.  Shared decision making counseling including risks and benefits of low-dose lung cancer screening, follow-up diagnostic testing that may be indicated and how comorbid conditions would affect diagnosis/treatment, false positive rates, and total radiation exposure.  Patient wishes to undergo screening, seen as they would be willing to seek diagnosis and treatment if necessitated by results.    5. Obesity (BMI 30-39.9)  Ongoing efforts to lose weight will be beneficial  and he was advised to continue doing so.       Follow Up:   Return in about 3 months (around 4/2/2025) for Recheck.  The patient was counseled on diagnostic results, risks and benefits of treatment options, risk factor modifications and the importance of treatment compliance. The patient was advised to contact the clinic with concerns or worsening symptoms.     FLACO Tsai  [facility] [place]    This document has been electronically signed by FLACO Tsai  January 2, 2025

## 2025-01-03 ENCOUNTER — PATIENT ROUNDING (BHMG ONLY) (OUTPATIENT)
Dept: PULMONOLOGY | Facility: CLINIC | Age: 60
End: 2025-01-03
Payer: COMMERCIAL

## 2025-01-10 DIAGNOSIS — Z12.2 ENCOUNTER FOR SCREENING FOR LUNG CANCER: ICD-10-CM

## 2025-01-10 DIAGNOSIS — Z87.891 PERSONAL HISTORY OF NICOTINE DEPENDENCE: ICD-10-CM

## 2025-02-05 ENCOUNTER — OFFICE VISIT (OUTPATIENT)
Dept: CARDIOLOGY | Facility: CLINIC | Age: 60
End: 2025-02-05
Payer: COMMERCIAL

## 2025-02-05 VITALS
HEIGHT: 68 IN | WEIGHT: 231.8 LBS | SYSTOLIC BLOOD PRESSURE: 150 MMHG | HEART RATE: 71 BPM | BODY MASS INDEX: 35.13 KG/M2 | DIASTOLIC BLOOD PRESSURE: 72 MMHG | OXYGEN SATURATION: 97 %

## 2025-02-05 DIAGNOSIS — I10 ESSENTIAL HYPERTENSION: ICD-10-CM

## 2025-02-05 DIAGNOSIS — E78.5 HYPERLIPIDEMIA LDL GOAL <100: ICD-10-CM

## 2025-02-05 DIAGNOSIS — R07.9 CHEST PAIN, UNSPECIFIED TYPE: Primary | ICD-10-CM

## 2025-02-05 PROCEDURE — 99214 OFFICE O/P EST MOD 30 MIN: CPT | Performed by: NURSE PRACTITIONER

## 2025-02-05 PROCEDURE — 3078F DIAST BP <80 MM HG: CPT | Performed by: NURSE PRACTITIONER

## 2025-02-05 PROCEDURE — 3077F SYST BP >= 140 MM HG: CPT | Performed by: NURSE PRACTITIONER

## 2025-02-05 RX ORDER — LISINOPRIL 20 MG/1
20 TABLET ORAL DAILY
Qty: 90 TABLET | Refills: 3 | Status: SHIPPED | OUTPATIENT
Start: 2025-02-05

## 2025-02-05 RX ORDER — HYDROCHLOROTHIAZIDE 12.5 MG/1
12.5 TABLET ORAL DAILY
Qty: 90 TABLET | Refills: 3 | Status: SHIPPED | OUTPATIENT
Start: 2025-02-05

## 2025-02-05 NOTE — PROGRESS NOTES
Chief Complaint   Patient presents with    Follow-up     Pt is here for cardiac follow up.  Pt would like to know which tests he can do to find any blockages. He relays that past stress test was stopped due to being so elevated.       Med Refill     Lisinopril refill, 90 day supply. Meds verified by verbal.     Labs Only     2023 last in chart. Pt just had some done last week with PCP. Cholesterol was 188, A1c went up to 9. They said everything else looked good.     Palpitations     Pt does have fluttering, pt is going to be tested soon for sleep apnea. Gets a burning sensation. Episodes last a few minutes.     Dizziness     Every now and then gets a bit light headed.         HPI:  HPI   Chris Ervin is a 59-year-old male with a history of CAD, hypertension, diabetes and hyperlipidemia. He had an MI in  and was stented.  His father  of heart disease at age 55.  He is a former smoker stopped in  after the MI.  He admitted to drinking a sixpack of beer a week but no more than 2 at a time.  Labs managed by PCP.  Recent labs 2023: Normal CBC glucose 260, creatinine 0.87, GFR 99, potassium 5.4, normal liver enzymes, TSH 0.866, total cholesterol 187, triglycerides 135, HDL 35, .  He has not seen a cardiologist since . He reported that he had a few episodes of chest discomfort and shortness of breath when walking up and down hills while deer hunting.  He has not had health insurance for a few years which has been causing stress.      he did have a Lexiscan stress test  that showed a hypertensive blood pressure response borderline ejection fraction and small septal infarct with no ischemia.  He was started on lisinopril and HCTZ.  Was recommended that we evaluate for sleep apnea.  Echocardiogram showed 50% ejection fraction with mild MVR and TVR.     He returns today for a follow-up visit. He is reporting chest discomfort and palpitations and occasional trouble catching breath. He  is scheduled to do sleep study tonight following with FLACO Tsai, [facility] pulmonology. BP elevated.     Cardiac History:    Past Surgical History:   Procedure Laterality Date    CARDIAC CATHETERIZATION Left 10/27/2015    PCI to proximal-mid LAD, Xience Alpine 2.75/38 mm drug-eluting stent.  LVEF 25 to 30%    CARDIOVASCULAR STRESS TEST  02/05/2024    Lexiscan- EF 49%. 224/68. Septal Infarct    CONVERTED (HISTORICAL) HOLTER  01/20/2025    6 Days. AVG 75. . Rare PAC & PVC    ECHO - CONVERTED  02/05/2024    TLS. EF 50%. Septal WMA. Trace-Mild MR. RVSP- 13 mmHg       Current Outpatient Medications   Medication Sig Dispense Refill    Cholecalciferol (Vitamin D3) 1.25 MG (39299 UT) capsule Take 1 capsule by mouth 1 (One) Time Per Week.      glimepiride (AMARYL) 4 MG tablet Take 1 tablet by mouth 2 (Two) Times a Day.      Insulin Aspart (novoLOG) 100 UNIT/ML injection Inject  under the skin into the appropriate area as directed 3 (Three) Times a Day Before Meals. Per Sliding scale      Lantus SoloStar 100 UNIT/ML injection pen inject 30 units sub-q TWICE DAILY      lisinopril (PRINIVIL,ZESTRIL) 20 MG tablet Take 1 tablet by mouth Daily. 90 tablet 3    Multiple Vitamins-Minerals (EYE VITAMINS PO) Take  by mouth 2 (Two) Times a Day.      Saw Palmetto 500 MG capsule Take 2 capsules by mouth Daily.      tamsulosin (FLOMAX) 0.4 MG capsule 24 hr capsule Take 1 capsule by mouth Daily.      hydroCHLOROthiazide 12.5 MG tablet Take 1 tablet by mouth Daily. 90 tablet 3     No current facility-administered medications for this visit.       Patient has no known allergies.    Past Medical History:   Diagnosis Date    Diabetes mellitus     History of carpal tunnel repair     right    Hx of tonsillectomy     Hyperlipidemia     Hypertension     Macular degeneration, wet     Myocardial infarction     Sleep apnea        Social History     Socioeconomic History    Marital status:    Tobacco Use    Smoking status:  "Former     Current packs/day: 0.00     Average packs/day: 1.5 packs/day for 30.0 years (45.0 ttl pk-yrs)     Types: Cigarettes     Start date: 10/27/1985     Quit date: 10/27/2015     Years since quittin.2     Passive exposure: Past    Smokeless tobacco: Never   Vaping Use    Vaping status: Never Used    Passive vaping exposure: Yes   Substance and Sexual Activity    Alcohol use: Not Currently     Comment: Might drink 4-5 in weekend and sometimes nothing in a month.    Drug use: Never    Sexual activity: Defer       Family History   Problem Relation Age of Onset    Hypertension Mother     Macular degeneration Mother         wet    Glaucoma Mother     Heart disease Father     Hypertension Father     Heart attack Father     Diabetes Father     Transient ischemic attack Father     No Known Problems Sister     No Known Problems Brother     No Known Problems Brother     Macular degeneration Maternal Grandmother     Lung cancer Maternal Grandmother     Heart attack Maternal Grandfather        Vitals:   /72   Pulse 71   Ht 172.1 cm (67.75\")   Wt 105 kg (231 lb 12.8 oz)   SpO2 97%   BMI 35.51 kg/m²     Physical Exam  Vitals and nursing note reviewed.   Constitutional:       Appearance: He is obese.   Neck:      Vascular: No carotid bruit.   Cardiovascular:      Rate and Rhythm: Normal rate and regular rhythm.      Pulses: Normal pulses.      Heart sounds: Normal heart sounds. No murmur heard.     No friction rub. No gallop.   Pulmonary:      Effort: Pulmonary effort is normal.      Breath sounds: Normal breath sounds and air entry.   Musculoskeletal:      Right lower leg: No edema.      Left lower leg: No edema.   Skin:     General: Skin is warm and dry.      Capillary Refill: Capillary refill takes less than 2 seconds.   Neurological:      Mental Status: He is alert and oriented to person, place, and time.       Procedures     Assessment & Plan     Diagnoses and all orders for this visit:    1. Chest pain, " unspecified type (Primary)  -     Stress Test With Myocardial Perfusion One Day; Future    2. Essential hypertension  -     lisinopril (PRINIVIL,ZESTRIL) 20 MG tablet; Take 1 tablet by mouth Daily.  Dispense: 90 tablet; Refill: 3    3. Hyperlipidemia LDL goal <100    Other orders  -     hydroCHLOROthiazide 12.5 MG tablet; Take 1 tablet by mouth Daily.  Dispense: 90 tablet; Refill: 3    Chest pain  - Recommend stress test to evaluate for ischemia    Hypertension  - BP elevated  - Continue lisinopril  - Restart HCTZ at 12.5 mg    Hyperlipidemia  - Labs managed with PCP  -Recommend lifestyle management with nutrition including eliminating sugar and refined carbohydrates and seed oils as well as limiting grains and starches    Palpitations  -Scheduled for sleep study    Refill lisinopril Restart HCTZ at 12.5 mg. Repeat stress test.      Visit Diagnoses:    ICD-10-CM ICD-9-CM   1. Chest pain, unspecified type  R07.9 786.50   2. Essential hypertension  I10 401.9   3. Hyperlipidemia LDL goal <100  E78.5 272.4       Meds Ordered During Visit:  New Medications Ordered This Visit   Medications    hydroCHLOROthiazide 12.5 MG tablet     Sig: Take 1 tablet by mouth Daily.     Dispense:  90 tablet     Refill:  3    lisinopril (PRINIVIL,ZESTRIL) 20 MG tablet     Sig: Take 1 tablet by mouth Daily.     Dispense:  90 tablet     Refill:  3       Follow Up Appointment:   Return in about 6 months (around 8/5/2025), or if symptoms worsen or fail to improve.           This document has been electronically signed by FLACO Delacruz  February 7, 2025 12:20 EST    Dictated Utilizing Dragon Dictation: Part of this note may be an electronic transcription/translation of spoken language to printed text using the Dragon Dictation System.

## 2025-02-07 ENCOUNTER — TELEPHONE (OUTPATIENT)
Dept: PULMONOLOGY | Facility: CLINIC | Age: 60
End: 2025-02-07
Payer: COMMERCIAL

## 2025-02-07 DIAGNOSIS — G47.33 OSA (OBSTRUCTIVE SLEEP APNEA): Primary | ICD-10-CM

## 2025-02-07 DIAGNOSIS — G47.19 DAYTIME HYPERSOMNOLENCE: ICD-10-CM

## 2025-02-07 DIAGNOSIS — R06.83 SNORING: ICD-10-CM

## 2025-02-07 NOTE — TELEPHONE ENCOUNTER
Patient made aware of sleep study results. Will send [employer] order to [employer].

## 2025-02-19 ENCOUNTER — TELEPHONE (OUTPATIENT)
Dept: PULMONOLOGY | Facility: CLINIC | Age: 60
End: 2025-02-19
Payer: COMMERCIAL

## 2025-02-19 NOTE — TELEPHONE ENCOUNTER
Caller: Chris Ervin    Relationship to patient: Self    Best call back number:     [phone] (Home)       Patient is needing: [facility] IS CALLING, THEY ARE ASKING FOR A DEMOGRAPHICS SHEET TO BE FAXED OVER TO THEM  FAX [phone]

## 2025-04-09 ENCOUNTER — TELEPHONE (OUTPATIENT)
Dept: CARDIOLOGY | Facility: CLINIC | Age: 60
End: 2025-04-09
Payer: COMMERCIAL

## 2025-04-09 NOTE — TELEPHONE ENCOUNTER
Caller: Chris Ervin    Relationship to patient: Self    Best call back number: [phone]    Chief complaint:     Type of visit: STRESS TEST/ECHO    Requested date: ASAP     If rescheduling, when is the original appointment: 4-14-25     Additional notes:PATIENT IS CALLING TO RESCHEDULE STRESS TEST DUE TO WAITING ON INSURANCE TO BE SENT THROUGH. UNABLE TO WARM TRANSFER, PLEASE REACH OUT.

## 2025-04-09 NOTE — TELEPHONE ENCOUNTER
Spoke with patient concerning rescheduling appointment for stress and echo. Patient requesting phone number to [facility] be sent in [place] related to he is currently unable to take down number. [employer] message sent to patient.

## 2025-04-28 ENCOUNTER — HOSPITAL ENCOUNTER (OUTPATIENT)
Dept: CARDIOLOGY | Facility: HOSPITAL | Age: 60
Discharge: HOME | End: 2025-04-28
Payer: COMMERCIAL

## 2025-04-28 DIAGNOSIS — R07.9 CHEST PAIN, UNSPECIFIED TYPE: ICD-10-CM

## 2025-04-28 LAB
BH CV REST NUCLEAR ISOTOPE DOSE: 10 MCI
BH CV STRESS NUCLEAR ISOTOPE DOSE: 30 MCI
BH CV STRESS RECOVERY BP: NORMAL MMHG
BH CV STRESS RECOVERY HR: 96 BPM
MAXIMAL PREDICTED HEART RATE: 158 BPM
PERCENT MAX PREDICTED HR: 89.24 %
SPECT HRT GATED+EF W RNC IV: 42 %
STRESS BASELINE BP: NORMAL MMHG
STRESS BASELINE HR: 74 BPM
STRESS PERCENT HR: 105 %
STRESS POST ESTIMATED WORKLOAD: 7 METS
STRESS POST EXERCISE DUR MIN: 5 MIN
STRESS POST EXERCISE DUR SEC: 40 SEC
STRESS POST PEAK BP: NORMAL MMHG
STRESS POST PEAK HR: 141 BPM
STRESS TARGET HR: 134 BPM

## 2025-04-28 PROCEDURE — 34310000005 TECHNETIUM SESTAMIBI: Performed by: INTERNAL MEDICINE

## 2025-04-28 PROCEDURE — A9500 TC99M SESTAMIBI: HCPCS | Performed by: INTERNAL MEDICINE

## 2025-04-28 PROCEDURE — 78452 HT MUSCLE IMAGE SPECT MULT: CPT | Performed by: INTERNAL MEDICINE

## 2025-04-28 PROCEDURE — 93018 CV STRESS TEST I&R ONLY: CPT | Performed by: INTERNAL MEDICINE

## 2025-04-28 PROCEDURE — 78452 HT MUSCLE IMAGE SPECT MULT: CPT

## 2025-04-28 PROCEDURE — 93017 CV STRESS TEST TRACING ONLY: CPT

## 2025-04-28 RX ADMIN — TECHNETIUM TC 99M SESTAMIBI 1 DOSE: 1 INJECTION INTRAVENOUS at 14:02

## 2025-04-28 RX ADMIN — TECHNETIUM TC 99M SESTAMIBI 1 DOSE: 1 INJECTION INTRAVENOUS at 12:23
